# Patient Record
Sex: FEMALE | Race: WHITE | Employment: OTHER | URBAN - METROPOLITAN AREA
[De-identification: names, ages, dates, MRNs, and addresses within clinical notes are randomized per-mention and may not be internally consistent; named-entity substitution may affect disease eponyms.]

---

## 2017-05-03 PROBLEM — I70.0 AORTIC ATHEROSCLEROSIS (HCC): Status: ACTIVE | Noted: 2017-05-03

## 2017-09-20 PROCEDURE — 81001 URINALYSIS AUTO W/SCOPE: CPT | Performed by: SPECIALIST

## 2017-12-14 PROBLEM — M25.561 ACUTE PAIN OF RIGHT KNEE: Status: ACTIVE | Noted: 2017-12-14

## 2017-12-14 PROBLEM — L98.9 SKIN LESIONS: Status: ACTIVE | Noted: 2017-12-14

## 2018-02-05 PROBLEM — I51.89 LEFT VENTRICULAR DIASTOLIC DYSFUNCTION WITH PRESERVED SYSTOLIC FUNCTION: Status: ACTIVE | Noted: 2018-02-05

## 2018-05-21 PROBLEM — M25.561 ACUTE PAIN OF RIGHT KNEE: Status: RESOLVED | Noted: 2017-12-14 | Resolved: 2018-05-21

## 2018-05-21 PROBLEM — L98.9 SKIN LESIONS: Status: RESOLVED | Noted: 2017-12-14 | Resolved: 2018-05-21

## 2018-08-22 PROBLEM — M17.12 PRIMARY OSTEOARTHRITIS OF LEFT KNEE: Status: ACTIVE | Noted: 2018-08-22

## 2018-12-10 PROBLEM — B07.8 OTHER VIRAL WARTS: Status: ACTIVE | Noted: 2018-12-10

## 2019-03-26 PROBLEM — N18.30 CKD (CHRONIC KIDNEY DISEASE) STAGE 3, GFR 30-59 ML/MIN (HCC): Status: ACTIVE | Noted: 2019-03-26

## 2019-05-31 PROCEDURE — 81001 URINALYSIS AUTO W/SCOPE: CPT | Performed by: NURSE PRACTITIONER

## 2019-05-31 PROCEDURE — 87086 URINE CULTURE/COLONY COUNT: CPT | Performed by: NURSE PRACTITIONER

## 2019-07-30 PROBLEM — R07.2 PRECORDIAL PAIN: Status: ACTIVE | Noted: 2019-07-30

## 2020-12-28 PROBLEM — Z01.818 PREOPERATIVE EVALUATION TO RULE OUT SURGICAL CONTRAINDICATION: Status: ACTIVE | Noted: 2020-12-28

## 2023-05-19 ENCOUNTER — HOSPITAL ENCOUNTER (INPATIENT)
Facility: HOSPITAL | Age: 88
LOS: 2 days | Discharge: HOME | End: 2023-05-21
Attending: EMERGENCY MEDICINE | Admitting: STUDENT IN AN ORGANIZED HEALTH CARE EDUCATION/TRAINING PROGRAM
Payer: MEDICARE

## 2023-05-19 DIAGNOSIS — E87.1 HYPONATREMIA: ICD-10-CM

## 2023-05-19 DIAGNOSIS — N17.9 ACUTE RENAL FAILURE, UNSPECIFIED ACUTE RENAL FAILURE TYPE (HCC): Primary | ICD-10-CM

## 2023-05-19 LAB — SARS-COV-2 RNA RESP QL NAA+PROBE: NOT DETECTED

## 2023-05-19 PROCEDURE — 83735 ASSAY OF MAGNESIUM: CPT | Performed by: INTERNAL MEDICINE

## 2023-05-19 PROCEDURE — 80069 RENAL FUNCTION PANEL: CPT | Performed by: INTERNAL MEDICINE

## 2023-05-19 RX ORDER — CLOPIDOGREL BISULFATE 75 MG/1
75 TABLET ORAL DAILY
Status: DISCONTINUED | OUTPATIENT
Start: 2023-05-20 | End: 2023-05-21

## 2023-05-19 RX ORDER — SODIUM CHLORIDE 9 MG/ML
1000 INJECTION, SOLUTION INTRAVENOUS ONCE
Status: COMPLETED | OUTPATIENT
Start: 2023-05-19 | End: 2023-05-19

## 2023-05-19 RX ORDER — SODIUM CHLORIDE, SODIUM LACTATE, POTASSIUM CHLORIDE, CALCIUM CHLORIDE 600; 310; 30; 20 MG/100ML; MG/100ML; MG/100ML; MG/100ML
INJECTION, SOLUTION INTRAVENOUS CONTINUOUS
Status: DISCONTINUED | OUTPATIENT
Start: 2023-05-19 | End: 2023-05-21

## 2023-05-19 RX ORDER — ATORVASTATIN CALCIUM 20 MG/1
20 TABLET, FILM COATED ORAL NIGHTLY
Status: DISCONTINUED | OUTPATIENT
Start: 2023-05-19 | End: 2023-05-21

## 2023-05-19 RX ORDER — IPRATROPIUM BROMIDE 21 UG/1
2 SPRAY, METERED NASAL EVERY 12 HOURS
Status: DISCONTINUED | OUTPATIENT
Start: 2023-05-19 | End: 2023-05-21

## 2023-05-19 RX ORDER — ACETAMINOPHEN 500 MG
500 TABLET ORAL EVERY 4 HOURS PRN
Status: DISCONTINUED | OUTPATIENT
Start: 2023-05-19 | End: 2023-05-21

## 2023-05-19 RX ORDER — ONDANSETRON 2 MG/ML
4 INJECTION INTRAMUSCULAR; INTRAVENOUS EVERY 6 HOURS PRN
Status: DISCONTINUED | OUTPATIENT
Start: 2023-05-19 | End: 2023-05-21

## 2023-05-19 RX ORDER — FLUTICASONE PROPIONATE 50 MCG
2 SPRAY, SUSPENSION (ML) NASAL DAILY
Status: DISCONTINUED | OUTPATIENT
Start: 2023-05-19 | End: 2023-05-21

## 2023-05-19 RX ORDER — HEPARIN SODIUM 5000 [USP'U]/ML
5000 INJECTION, SOLUTION INTRAVENOUS; SUBCUTANEOUS EVERY 8 HOURS SCHEDULED
Status: DISCONTINUED | OUTPATIENT
Start: 2023-05-19 | End: 2023-05-21

## 2023-05-19 RX ORDER — LISINOPRIL 10 MG/1
10 TABLET ORAL 2 TIMES DAILY
Status: DISCONTINUED | OUTPATIENT
Start: 2023-05-19 | End: 2023-05-21

## 2023-05-19 RX ORDER — METOPROLOL SUCCINATE 50 MG/1
50 TABLET, EXTENDED RELEASE ORAL 2 TIMES DAILY
Status: DISCONTINUED | OUTPATIENT
Start: 2023-05-19 | End: 2023-05-21

## 2023-05-19 RX ORDER — METOCLOPRAMIDE HYDROCHLORIDE 5 MG/ML
10 INJECTION INTRAMUSCULAR; INTRAVENOUS EVERY 8 HOURS PRN
Status: DISCONTINUED | OUTPATIENT
Start: 2023-05-19 | End: 2023-05-21

## 2023-05-20 LAB
ALBUMIN SERPL-MCNC: 2.8 G/DL (ref 3.4–5)
ANION GAP SERPL CALC-SCNC: 6 MMOL/L (ref 0–18)
ANION GAP SERPL CALC-SCNC: 7 MMOL/L (ref 0–18)
BILIRUB UR QL STRIP.AUTO: NEGATIVE
BUN BLD-MCNC: 24 MG/DL (ref 7–18)
BUN BLD-MCNC: 28 MG/DL (ref 7–18)
CALCIUM BLD-MCNC: 7.9 MG/DL (ref 8.5–10.1)
CALCIUM BLD-MCNC: 8.1 MG/DL (ref 8.5–10.1)
CHLORIDE SERPL-SCNC: 104 MMOL/L (ref 98–112)
CHLORIDE SERPL-SCNC: 109 MMOL/L (ref 98–112)
CLARITY UR REFRACT.AUTO: CLEAR
CO2 SERPL-SCNC: 17 MMOL/L (ref 21–32)
CO2 SERPL-SCNC: 19 MMOL/L (ref 21–32)
COLOR UR AUTO: COLORLESS
CREAT BLD-MCNC: 1.59 MG/DL
CREAT BLD-MCNC: 1.96 MG/DL
ERYTHROCYTE [DISTWIDTH] IN BLOOD BY AUTOMATED COUNT: 12.9 %
GFR SERPLBLD BASED ON 1.73 SQ M-ARVRAT: 24 ML/MIN/1.73M2 (ref 60–?)
GFR SERPLBLD BASED ON 1.73 SQ M-ARVRAT: 31 ML/MIN/1.73M2 (ref 60–?)
GLUCOSE BLD-MCNC: 102 MG/DL (ref 70–99)
GLUCOSE BLD-MCNC: 106 MG/DL (ref 70–99)
GLUCOSE UR STRIP.AUTO-MCNC: NEGATIVE MG/DL
HCT VFR BLD AUTO: 37.8 %
HGB BLD-MCNC: 12.7 G/DL
KETONES UR STRIP.AUTO-MCNC: NEGATIVE MG/DL
LEUKOCYTE ESTERASE UR QL STRIP.AUTO: NEGATIVE
MAGNESIUM SERPL-MCNC: 1.6 MG/DL (ref 1.6–2.6)
MCH RBC QN AUTO: 31 PG (ref 26–34)
MCHC RBC AUTO-ENTMCNC: 33.6 G/DL (ref 31–37)
MCV RBC AUTO: 92.2 FL
NITRITE UR QL STRIP.AUTO: NEGATIVE
OSMOLALITY SERPL CALC.SUM OF ELEC: 272 MOSM/KG (ref 275–295)
OSMOLALITY SERPL CALC.SUM OF ELEC: 282 MOSM/KG (ref 275–295)
OSMOLALITY UR: 140 MOSM/KG (ref 300–1300)
PH UR STRIP.AUTO: 6 [PH] (ref 5–8)
PHOSPHATE SERPL-MCNC: 2.7 MG/DL (ref 2.5–4.9)
PLATELET # BLD AUTO: 166 10(3)UL (ref 150–450)
POTASSIUM SERPL-SCNC: 3.4 MMOL/L (ref 3.5–5.1)
POTASSIUM SERPL-SCNC: 3.9 MMOL/L (ref 3.5–5.1)
PROT UR STRIP.AUTO-MCNC: NEGATIVE MG/DL
RBC # BLD AUTO: 4.1 X10(6)UL
SODIUM SERPL-SCNC: 128 MMOL/L (ref 136–145)
SODIUM SERPL-SCNC: 134 MMOL/L (ref 136–145)
SODIUM SERPL-SCNC: 30 MMOL/L
SP GR UR STRIP.AUTO: 1 (ref 1–1.03)
UROBILINOGEN UR STRIP.AUTO-MCNC: <2 MG/DL
WBC # BLD AUTO: 6.1 X10(3) UL (ref 4–11)

## 2023-05-20 PROCEDURE — 84300 ASSAY OF URINE SODIUM: CPT | Performed by: INTERNAL MEDICINE

## 2023-05-20 PROCEDURE — 83935 ASSAY OF URINE OSMOLALITY: CPT | Performed by: INTERNAL MEDICINE

## 2023-05-20 PROCEDURE — 85027 COMPLETE CBC AUTOMATED: CPT | Performed by: STUDENT IN AN ORGANIZED HEALTH CARE EDUCATION/TRAINING PROGRAM

## 2023-05-20 PROCEDURE — 97161 PT EVAL LOW COMPLEX 20 MIN: CPT

## 2023-05-20 PROCEDURE — 81001 URINALYSIS AUTO W/SCOPE: CPT | Performed by: EMERGENCY MEDICINE

## 2023-05-20 PROCEDURE — 80048 BASIC METABOLIC PNL TOTAL CA: CPT | Performed by: STUDENT IN AN ORGANIZED HEALTH CARE EDUCATION/TRAINING PROGRAM

## 2023-05-20 PROCEDURE — 97530 THERAPEUTIC ACTIVITIES: CPT

## 2023-05-20 RX ORDER — MAGNESIUM OXIDE 400 MG/1
400 TABLET ORAL ONCE
Status: COMPLETED | OUTPATIENT
Start: 2023-05-20 | End: 2023-05-20

## 2023-05-20 NOTE — ED INITIAL ASSESSMENT (HPI)
Pt arrives via EMS from [facility], pt has been having n/v/d. Patient states whole family got food poisoning. Pt sent as she is ntoed to have sodium 122. Received 1 Liter fluids at [facility].  A&O x4

## 2023-05-20 NOTE — PLAN OF CARE
Pt AOx3. Confused at times. VSS. RA. No complain of pain N/V/D. Good appetite. LR infusing at 83 ml/hr. Fall precautions maintained. Will continue plan of care.      Problem: GASTROINTESTINAL - ADULT  Goal: Minimal or absence of nausea and vomiting  Description: INTERVENTIONS:  - Maintain adequate hydration with IV or PO as ordered and tolerated  - Nasogastric tube to low intermittent suction as ordered  - Evaluate effectiveness of ordered antiemetic medications  - Provide nonpharmacologic comfort measures as appropriate  - Advance diet as tolerated, if ordered  - Obtain nutritional consult as needed  - Evaluate fluid balance  5/20/2023 1536 by Wili Mercado RN  Outcome: Progressing  5/20/2023 1528 by Wili Mercado RN  Outcome: Progressing     Problem: METABOLIC/FLUID AND ELECTROLYTES - ADULT  Goal: Electrolytes maintained within normal limits  Description: INTERVENTIONS:  - Monitor labs and rhythm and assess patient for signs and symptoms of electrolyte imbalances  - Administer electrolyte replacement as ordered  - Monitor response to electrolyte replacements, including rhythm and repeat lab results as appropriate  - Fluid restriction as ordered  - Instruct patient on fluid and nutrition restrictions as appropriate  5/20/2023 1536 by Wili Mercado RN  Outcome: Progressing  5/20/2023 1528 by Wili Mercado RN  Outcome: Progressing  Goal: Hemodynamic stability and optimal renal function maintained  Description: INTERVENTIONS:  - Monitor labs and assess for signs and symptoms of volume excess or deficit  - Monitor intake, output and patient weight  - Monitor urine specific gravity, serum osmolarity and serum sodium as indicated or ordered  - Monitor response to interventions for patient's volume status, including labs, urine output, blood pressure (other measures as available)  - Encourage oral intake as appropriate  - Instruct patient on fluid and nutrition restrictions as appropriate  5/20/2023 1536 by Itzel Carr, RN  Outcome: Progressing  5/20/2023 1528 by Itzel Carr, RN  Outcome: Progressing

## 2023-05-20 NOTE — ED QUICK NOTES
Orders for admission, patient is aware of plan and ready to go upstairs. Any questions, please call ED RN Governor Stands at extension [phone]    Vaccinated?  Yes      Titratable drug(s) infusing:   Rate: N/A    LOC at time of transport: A&Ox4    Other pertinent information: Still need urine and stool samples    CIWA score=  NIH score=

## 2023-05-20 NOTE — PROGRESS NOTES
NURSING ADMISSION NOTE      Patient admitted via Cart  Oriented to room. Safety precautions initiated. Bed in low position. Call light in reach. Admitted to  420 with N,V,D. Pt alert x2. Some confusion noted. Med rec completed with REA over phone. POC discussed. IV fluids infusing. Ambulates with assistance. Bed alarm on. Call light within reach.

## 2023-05-21 VITALS
DIASTOLIC BLOOD PRESSURE: 60 MMHG | BODY MASS INDEX: 26 KG/M2 | HEART RATE: 61 BPM | RESPIRATION RATE: 18 BRPM | OXYGEN SATURATION: 93 % | WEIGHT: 149 LBS | TEMPERATURE: 99 F | SYSTOLIC BLOOD PRESSURE: 171 MMHG

## 2023-05-21 LAB
ANION GAP SERPL CALC-SCNC: 7 MMOL/L (ref 0–18)
BUN BLD-MCNC: 19 MG/DL (ref 7–18)
CALCIUM BLD-MCNC: 8.3 MG/DL (ref 8.5–10.1)
CHLORIDE SERPL-SCNC: 114 MMOL/L (ref 98–112)
CO2 SERPL-SCNC: 22 MMOL/L (ref 21–32)
CREAT BLD-MCNC: 1.25 MG/DL
GFR SERPLBLD BASED ON 1.73 SQ M-ARVRAT: 41 ML/MIN/1.73M2 (ref 60–?)
GLUCOSE BLD-MCNC: 97 MG/DL (ref 70–99)
MAGNESIUM SERPL-MCNC: 1.8 MG/DL (ref 1.6–2.6)
OSMOLALITY SERPL CALC.SUM OF ELEC: 298 MOSM/KG (ref 275–295)
POTASSIUM SERPL-SCNC: 3.4 MMOL/L (ref 3.5–5.1)
SODIUM SERPL-SCNC: 143 MMOL/L (ref 136–145)

## 2023-05-21 PROCEDURE — 83735 ASSAY OF MAGNESIUM: CPT | Performed by: INTERNAL MEDICINE

## 2023-05-21 PROCEDURE — 80048 BASIC METABOLIC PNL TOTAL CA: CPT | Performed by: INTERNAL MEDICINE

## 2023-05-21 RX ORDER — MAGNESIUM OXIDE 400 MG/1
400 TABLET ORAL ONCE
Status: COMPLETED | OUTPATIENT
Start: 2023-05-21 | End: 2023-05-21

## 2023-05-21 RX ORDER — POTASSIUM CHLORIDE 20 MEQ/1
40 TABLET, EXTENDED RELEASE ORAL ONCE
Status: COMPLETED | OUTPATIENT
Start: 2023-05-21 | End: 2023-05-21

## 2023-05-21 NOTE — PLAN OF CARE
Discharged Home via Wheelchair. Accompanied by Family member  Belongings Taken by patient/family. Discharge teaching provided and verbalized understanding. Florencio Araiza taken home with the patient.     Problem: GASTROINTESTINAL - ADULT  Goal: Minimal or absence of nausea and vomiting  Description: INTERVENTIONS:  - Maintain adequate hydration with IV or PO as ordered and tolerated  - Nasogastric tube to low intermittent suction as ordered  - Evaluate effectiveness of ordered antiemetic medications  - Provide nonpharmacologic comfort measures as appropriate  - Advance diet as tolerated, if ordered  - Obtain nutritional consult as needed  - Evaluate fluid balance  Outcome: Adequate for Discharge     Problem: METABOLIC/FLUID AND ELECTROLYTES - ADULT  Goal: Electrolytes maintained within normal limits  Description: INTERVENTIONS:  - Monitor labs and rhythm and assess patient for signs and symptoms of electrolyte imbalances  - Administer electrolyte replacement as ordered  - Monitor response to electrolyte replacements, including rhythm and repeat lab results as appropriate  - Fluid restriction as ordered  - Instruct patient on fluid and nutrition restrictions as appropriate  Outcome: Adequate for Discharge  Goal: Hemodynamic stability and optimal renal function maintained  Description: INTERVENTIONS:  - Monitor labs and assess for signs and symptoms of volume excess or deficit  - Monitor intake, output and patient weight  - Monitor urine specific gravity, serum osmolarity and serum sodium as indicated or ordered  - Monitor response to interventions for patient's volume status, including labs, urine output, blood pressure (other measures as available)  - Encourage oral intake as appropriate  - Instruct patient on fluid and nutrition restrictions as appropriate  Outcome: Adequate for Discharge

## 2023-05-21 NOTE — PLAN OF CARE
Pt A&O x3-4 reoriented to location [place], on RA, VSS, Afebrile. Pt denies N/V/D and no pain. Pt slept well overnight. IVF infusing. Fall precautions in place. Call light within reach.